# Patient Record
Sex: MALE | Race: WHITE | NOT HISPANIC OR LATINO | Employment: FULL TIME | URBAN - METROPOLITAN AREA
[De-identification: names, ages, dates, MRNs, and addresses within clinical notes are randomized per-mention and may not be internally consistent; named-entity substitution may affect disease eponyms.]

---

## 2023-03-07 PROBLEM — F41.1 ANXIETY STATE: Status: ACTIVE | Noted: 2019-01-01

## 2023-03-07 RX ORDER — VENLAFAXINE HYDROCHLORIDE 75 MG/1
1 CAPSULE, EXTENDED RELEASE ORAL EVERY MORNING
COMMUNITY
Start: 2022-11-07 | End: 2023-03-23 | Stop reason: SDUPTHER

## 2023-03-22 NOTE — PROGRESS NOTES
Subjective   Patient ID: Jimmy Jenkins is a 32 y.o. male who presents for Anxiety.    HPI   Patient presents with anxiety.  Condition well controlled.  Taking and tolerating Effexor XR 75mg qAM.  Reduced dose last visit and still felt like it was controlling anxiety well.  Coping with stress well.  Denies depressed mood. Denies panic attacks. Denies suicidal thoughts.  Much less stress with his current job. Working from home about half the time.   Watching diet closer and trying to lose wt.     Review of Systems    Objective   /80   Pulse 83   Temp 36.4 °C (97.6 °F)   Wt 92.1 kg (203 lb)   SpO2 98%     Physical Exam  HENT:      Head: Normocephalic.   Eyes:      General: No scleral icterus.  Cardiovascular:      Rate and Rhythm: Normal rate and regular rhythm.   Pulmonary:      Effort: Pulmonary effort is normal.      Breath sounds: Normal breath sounds.   Skin:     General: Skin is warm and dry.   Neurological:      Mental Status: He is alert.   Psychiatric:         Mood and Affect: Affect normal.       Assessment/Plan   Diagnoses and all orders for this visit:  GLORY (generalized anxiety disorder)  -     venlafaxine XR (Effexor-XR) 75 mg 24 hr capsule; Take 1 capsule (75 mg) by mouth once daily in the morning. (Replaces 150mg)       Counseled pt.    Discussed diet and exercise, and encouraged further weight loss.   Refilled Effexor XR 75mg  Follow up in 4 months, earlier if needed.

## 2023-03-23 ENCOUNTER — OFFICE VISIT (OUTPATIENT)
Dept: PRIMARY CARE | Facility: CLINIC | Age: 33
End: 2023-03-23
Payer: COMMERCIAL

## 2023-03-23 VITALS
SYSTOLIC BLOOD PRESSURE: 122 MMHG | TEMPERATURE: 97.6 F | OXYGEN SATURATION: 98 % | DIASTOLIC BLOOD PRESSURE: 80 MMHG | WEIGHT: 203 LBS | HEART RATE: 83 BPM

## 2023-03-23 DIAGNOSIS — F41.1 GAD (GENERALIZED ANXIETY DISORDER): Primary | ICD-10-CM

## 2023-03-23 PROCEDURE — 1036F TOBACCO NON-USER: CPT | Performed by: PHYSICIAN ASSISTANT

## 2023-03-23 PROCEDURE — 99213 OFFICE O/P EST LOW 20 MIN: CPT | Performed by: PHYSICIAN ASSISTANT

## 2023-03-23 RX ORDER — VENLAFAXINE HYDROCHLORIDE 75 MG/1
75 CAPSULE, EXTENDED RELEASE ORAL EVERY MORNING
Qty: 90 CAPSULE | Refills: 0 | Status: SHIPPED | OUTPATIENT
Start: 2023-03-23 | End: 2023-08-10 | Stop reason: DRUGHIGH

## 2023-03-23 ASSESSMENT — PATIENT HEALTH QUESTIONNAIRE - PHQ9
2. FEELING DOWN, DEPRESSED OR HOPELESS: NOT AT ALL
1. LITTLE INTEREST OR PLEASURE IN DOING THINGS: NOT AT ALL
SUM OF ALL RESPONSES TO PHQ9 QUESTIONS 1 AND 2: 0

## 2023-08-10 ENCOUNTER — OFFICE VISIT (OUTPATIENT)
Dept: PRIMARY CARE | Facility: CLINIC | Age: 33
End: 2023-08-10
Payer: COMMERCIAL

## 2023-08-10 VITALS
HEART RATE: 86 BPM | TEMPERATURE: 97.1 F | DIASTOLIC BLOOD PRESSURE: 74 MMHG | SYSTOLIC BLOOD PRESSURE: 122 MMHG | OXYGEN SATURATION: 98 %

## 2023-08-10 DIAGNOSIS — F41.1 GAD (GENERALIZED ANXIETY DISORDER): Primary | ICD-10-CM

## 2023-08-10 PROCEDURE — 99213 OFFICE O/P EST LOW 20 MIN: CPT | Performed by: PHYSICIAN ASSISTANT

## 2023-08-10 PROCEDURE — 1036F TOBACCO NON-USER: CPT | Performed by: PHYSICIAN ASSISTANT

## 2023-08-10 RX ORDER — VENLAFAXINE HYDROCHLORIDE 37.5 MG/1
37.5 CAPSULE, EXTENDED RELEASE ORAL DAILY
Qty: 30 CAPSULE | Refills: 1 | Status: SHIPPED | OUTPATIENT
Start: 2023-08-10 | End: 2023-11-07 | Stop reason: SDUPTHER

## 2023-08-10 RX ORDER — VENLAFAXINE HYDROCHLORIDE 75 MG/1
75 CAPSULE, EXTENDED RELEASE ORAL EVERY MORNING
Qty: 90 CAPSULE | Refills: 1 | Status: CANCELLED | OUTPATIENT
Start: 2023-08-10

## 2023-08-10 NOTE — PROGRESS NOTES
Subjective   Patient ID: Jimmy Jenkins is a 32 y.o. male who presents for No chief complaint on file..    HPI   Patient presents with anxiety.  Condition well controlled.  Taking and tolerating Effexor XR 75mg qAM.  Coping with stress well.  Denies depressed mood. Denies panic attacks. Denies suicidal thoughts.  Much less stress with his current job. Working from home about half the time. Interested in coming off the med.   Watching diet and staying.     Review of Systems    Objective   There were no vitals taken for this visit.    Physical Exam  HENT:      Head: Normocephalic.   Eyes:      General: No scleral icterus.  Cardiovascular:      Rate and Rhythm: Normal rate and regular rhythm.   Pulmonary:      Effort: Pulmonary effort is normal.      Breath sounds: Normal breath sounds.   Skin:     General: Skin is warm and dry.   Neurological:      Mental Status: He is alert.   Psychiatric:         Mood and Affect: Affect normal.       Assessment/Plan   Diagnoses and all orders for this visit:  GLORY (generalized anxiety disorder)       Counseled pt.    Discussed diet and exercise, and encouraged further weight loss.   Reduce to Venlafaxine XR 37.5mg for 1 month and then can try stopping the med.    Follow up if symptoms increase again or prn.

## 2023-11-07 ENCOUNTER — OFFICE VISIT (OUTPATIENT)
Dept: PRIMARY CARE | Facility: CLINIC | Age: 33
End: 2023-11-07
Payer: COMMERCIAL

## 2023-11-07 VITALS
TEMPERATURE: 97.5 F | SYSTOLIC BLOOD PRESSURE: 126 MMHG | DIASTOLIC BLOOD PRESSURE: 80 MMHG | HEART RATE: 79 BPM | WEIGHT: 197 LBS | OXYGEN SATURATION: 97 %

## 2023-11-07 DIAGNOSIS — F41.1 GAD (GENERALIZED ANXIETY DISORDER): Primary | ICD-10-CM

## 2023-11-07 PROCEDURE — 99213 OFFICE O/P EST LOW 20 MIN: CPT | Performed by: PHYSICIAN ASSISTANT

## 2023-11-07 PROCEDURE — 1036F TOBACCO NON-USER: CPT | Performed by: PHYSICIAN ASSISTANT

## 2023-11-07 RX ORDER — VENLAFAXINE HYDROCHLORIDE 75 MG/1
75 CAPSULE, EXTENDED RELEASE ORAL DAILY
Qty: 90 CAPSULE | Refills: 1 | Status: SHIPPED | OUTPATIENT
Start: 2023-11-07 | End: 2024-03-05 | Stop reason: SDUPTHER

## 2023-11-07 RX ORDER — VENLAFAXINE HYDROCHLORIDE 37.5 MG/1
37.5 CAPSULE, EXTENDED RELEASE ORAL DAILY
Qty: 30 CAPSULE | Refills: 0 | Status: SHIPPED | OUTPATIENT
Start: 2023-11-07 | End: 2024-02-05 | Stop reason: ALTCHOICE

## 2023-11-07 NOTE — PROGRESS NOTES
Subjective   Patient ID: Jimmy Jenkins is a 33 y.o. male who presents for Anxiety (Discuss Meds).    HPI   Patient presents with anxiety. Been off Venlafaxine the past 7-8 weeks.  Condition worsened off the med. More irritable. Mind racing. More stressed.   Denies depressed mood. Denies panic attacks. Denies suicidal thoughts.  Could even tell that symptoms were worsening when on the half dose (37.5mg) to wean off the med.  Would like to restart med. Thinks he will do fine on just the 75mg dose.   Review of Systems    Objective   /80   Pulse 79   Temp 36.4 °C (97.5 °F)   Wt 89.4 kg (197 lb)   SpO2 97%     Physical Exam  HENT:      Head: Normocephalic.   Eyes:      General: No scleral icterus.  Cardiovascular:      Rate and Rhythm: Normal rate and regular rhythm.   Pulmonary:      Effort: Pulmonary effort is normal.      Breath sounds: Normal breath sounds.   Skin:     General: Skin is warm and dry.   Neurological:      Mental Status: He is alert.   Psychiatric:         Mood and Affect: Affect normal.       Assessment/Plan   Diagnoses and all orders for this visit:  GLORY (generalized anxiety disorder)  -     venlafaxine XR (Effexor-XR) 37.5 mg 24 hr capsule; Take 1 capsule (37.5 mg) by mouth once daily.  -     venlafaxine XR (Effexor-XR) 75 mg 24 hr capsule; Take 1 capsule (75 mg) by mouth once daily. Take with food.       Counseled pt.   Restart Rx Venlafaxine XR 37.5 mg x 1 month and then increase to 75mg thereafter.   Follow up in 4 months for recheck anxiety, earlier if needed.

## 2024-03-05 ENCOUNTER — OFFICE VISIT (OUTPATIENT)
Dept: PRIMARY CARE | Facility: CLINIC | Age: 34
End: 2024-03-05
Payer: COMMERCIAL

## 2024-03-05 VITALS
DIASTOLIC BLOOD PRESSURE: 70 MMHG | OXYGEN SATURATION: 98 % | TEMPERATURE: 97.6 F | HEART RATE: 80 BPM | SYSTOLIC BLOOD PRESSURE: 114 MMHG | WEIGHT: 194 LBS

## 2024-03-05 DIAGNOSIS — F41.1 GAD (GENERALIZED ANXIETY DISORDER): Primary | ICD-10-CM

## 2024-03-05 PROCEDURE — 1036F TOBACCO NON-USER: CPT | Performed by: PHYSICIAN ASSISTANT

## 2024-03-05 PROCEDURE — 99213 OFFICE O/P EST LOW 20 MIN: CPT | Performed by: PHYSICIAN ASSISTANT

## 2024-03-05 RX ORDER — VENLAFAXINE HYDROCHLORIDE 37.5 MG/1
37.5 CAPSULE, EXTENDED RELEASE ORAL DAILY
Qty: 90 CAPSULE | Refills: 1 | Status: SHIPPED | OUTPATIENT
Start: 2024-03-05

## 2024-03-05 NOTE — PROGRESS NOTES
Subjective   Patient ID: Jimmy Jenkins is a 33 y.o. male who presents for Anxiety (Recheck ).    HPI   Patient presents with anxiety. Restarted Venlafaxine last visit. Taking 75mg every day and tolerating well. Less irritable. Less mind racing. Less stressed.  Denies depressed mood. Denies panic attacks. Denies suicidal thoughts.   Interested in reducing dose to 37.5mg since he felt fairly good on that dose.     Review of Systems    Objective   /70   Pulse 80   Temp 36.4 °C (97.6 °F)   Wt 88 kg (194 lb)   SpO2 98%     Physical Exam  HENT:      Head: Normocephalic.   Eyes:      General: No scleral icterus.  Cardiovascular:      Rate and Rhythm: Normal rate and regular rhythm.   Pulmonary:      Effort: Pulmonary effort is normal.      Breath sounds: Normal breath sounds.   Skin:     General: Skin is warm and dry.   Neurological:      Mental Status: He is alert.   Psychiatric:         Mood and Affect: Affect normal.       Assessment/Plan   Diagnoses and all orders for this visit:  GLORY (generalized anxiety disorder)  -     venlafaxine XR (Effexor-XR) 37.5 mg 24 hr capsule; Take 1 capsule (37.5 mg) by mouth once daily.       Counseled pt.   Switch back to Rx Venlafaxine XR 37.5mg.   Follow up in 4 months for recheck anxiety, earlier if needed.

## 2024-07-09 ENCOUNTER — APPOINTMENT (OUTPATIENT)
Dept: PRIMARY CARE | Facility: CLINIC | Age: 34
End: 2024-07-09
Payer: COMMERCIAL

## 2024-07-09 VITALS
HEART RATE: 75 BPM | DIASTOLIC BLOOD PRESSURE: 72 MMHG | TEMPERATURE: 97.8 F | OXYGEN SATURATION: 98 % | SYSTOLIC BLOOD PRESSURE: 114 MMHG | WEIGHT: 194 LBS

## 2024-07-09 DIAGNOSIS — F41.1 GAD (GENERALIZED ANXIETY DISORDER): Primary | ICD-10-CM

## 2024-07-09 PROCEDURE — 99213 OFFICE O/P EST LOW 20 MIN: CPT | Performed by: PHYSICIAN ASSISTANT

## 2024-07-09 PROCEDURE — 1036F TOBACCO NON-USER: CPT | Performed by: PHYSICIAN ASSISTANT

## 2024-07-09 RX ORDER — VENLAFAXINE HYDROCHLORIDE 75 MG/1
75 CAPSULE, EXTENDED RELEASE ORAL DAILY
Qty: 90 CAPSULE | Refills: 1 | Status: SHIPPED | OUTPATIENT
Start: 2024-07-09

## 2024-07-09 NOTE — PROGRESS NOTES
Subjective   Patient ID: Jimmy Jenkins is a 33 y.o. male who presents for Anxiety (Recheck ).    HPI   Patient presents with anxiety. Switched to Venlafaxine 37.5mg last visit (reduced from 75mg). Coping fair with stress he felt better on the 75 mg so would like to go back up.  Noticing a little bit more irritability and anxiousness on the lower dose.  Mood okay.  No panic attacks.  Tolerating medicine well.      Review of Systems    Objective   /72   Pulse 75   Temp 36.6 °C (97.8 °F)   Wt 88 kg (194 lb)   SpO2 98%     Physical Exam  HENT:      Head: Normocephalic.   Eyes:      General: No scleral icterus.  Cardiovascular:      Rate and Rhythm: Normal rate and regular rhythm.   Pulmonary:      Effort: Pulmonary effort is normal.      Breath sounds: Normal breath sounds.   Skin:     General: Skin is warm and dry.   Neurological:      Mental Status: He is alert.   Psychiatric:         Mood and Affect: Affect normal.       Assessment/Plan   Diagnoses and all orders for this visit:  GLORY (generalized anxiety disorder)  -     venlafaxine XR (Effexor-XR) 75 mg 24 hr capsule; Take 1 capsule (75 mg) by mouth once daily.     Counseled pt.   Switch back to Rx Venlafaxine XR 75mg every day.    Follow up in 4 months for recheck anxiety, earlier if needed.

## 2024-11-05 ENCOUNTER — APPOINTMENT (OUTPATIENT)
Dept: PRIMARY CARE | Facility: CLINIC | Age: 34
End: 2024-11-05
Payer: COMMERCIAL

## 2024-11-05 VITALS
OXYGEN SATURATION: 97 % | DIASTOLIC BLOOD PRESSURE: 68 MMHG | SYSTOLIC BLOOD PRESSURE: 116 MMHG | TEMPERATURE: 97.7 F | HEART RATE: 83 BPM | WEIGHT: 200 LBS

## 2024-11-05 DIAGNOSIS — Z00.00 ROUTINE GENERAL MEDICAL EXAMINATION AT A HEALTH CARE FACILITY: ICD-10-CM

## 2024-11-05 DIAGNOSIS — F41.1 GAD (GENERALIZED ANXIETY DISORDER): Primary | ICD-10-CM

## 2024-11-05 PROCEDURE — 1036F TOBACCO NON-USER: CPT | Performed by: PHYSICIAN ASSISTANT

## 2024-11-05 PROCEDURE — 99213 OFFICE O/P EST LOW 20 MIN: CPT | Performed by: PHYSICIAN ASSISTANT

## 2024-11-05 RX ORDER — VENLAFAXINE HYDROCHLORIDE 75 MG/1
75 CAPSULE, EXTENDED RELEASE ORAL DAILY
Qty: 90 CAPSULE | Refills: 1 | Status: SHIPPED | OUTPATIENT
Start: 2024-11-05

## 2024-11-05 ASSESSMENT — PATIENT HEALTH QUESTIONNAIRE - PHQ9: 1. LITTLE INTEREST OR PLEASURE IN DOING THINGS: NOT AT ALL

## 2024-11-05 NOTE — PROGRESS NOTES
"Subjective   Patient ID: Jimmy Jenkins is a 34 y.o. male who presents for Anxiety.    HPI   Patient presents with anxiety. Switched to Venlafaxine 75mg last visit. Controlling symptoms well. No significant mind racing.   Mood okay.  No panic attacks.  Tolerating medicine well.        No results found for: \"LDLCALC\"  LDL (mg/dL)   Date Value   02/04/2019 71     Triglycerides (mg/dL)   Date Value   02/04/2019 63     HDL (mg/dL)   Date Value   02/04/2019 54.1         Review of Systems    Objective   /68   Pulse 83   Temp 36.5 °C (97.7 °F)   Wt 90.7 kg (200 lb)   SpO2 97%     Physical Exam  HENT:      Head: Normocephalic.   Eyes:      General: No scleral icterus.  Cardiovascular:      Rate and Rhythm: Normal rate and regular rhythm.   Pulmonary:      Effort: Pulmonary effort is normal.      Breath sounds: Normal breath sounds.   Skin:     General: Skin is warm and dry.   Neurological:      Mental Status: He is alert.   Psychiatric:         Mood and Affect: Affect normal.       Assessment/Plan   Diagnoses and all orders for this visit:  GLORY (generalized anxiety disorder)  -     venlafaxine XR (Effexor-XR) 75 mg 24 hr capsule; Take 1 capsule (75 mg) by mouth once daily.  Routine general medical examination at a health care facility  -     Basic Metabolic Panel; Future  -     Lipid Panel; Future  -     Hepatitis C Antibody; Future  -     HIV 1/2 Antigen/Antibody Screen with Reflex to Confirmation; Future       Counseled pt.   Refilled Venlafaxine XR 75mg.    Follow up in 4 months for recheck anxiety, with fasting labs the week before.   "

## 2025-03-06 ENCOUNTER — APPOINTMENT (OUTPATIENT)
Dept: PRIMARY CARE | Facility: CLINIC | Age: 35
End: 2025-03-06
Payer: COMMERCIAL

## 2025-03-06 VITALS
WEIGHT: 198 LBS | HEART RATE: 79 BPM | OXYGEN SATURATION: 98 % | SYSTOLIC BLOOD PRESSURE: 116 MMHG | DIASTOLIC BLOOD PRESSURE: 78 MMHG | TEMPERATURE: 97.4 F

## 2025-03-06 DIAGNOSIS — F41.1 GAD (GENERALIZED ANXIETY DISORDER): Primary | ICD-10-CM

## 2025-03-06 PROCEDURE — 99213 OFFICE O/P EST LOW 20 MIN: CPT | Performed by: PHYSICIAN ASSISTANT

## 2025-03-06 PROCEDURE — 1036F TOBACCO NON-USER: CPT | Performed by: PHYSICIAN ASSISTANT

## 2025-03-06 RX ORDER — VENLAFAXINE HYDROCHLORIDE 75 MG/1
75 CAPSULE, EXTENDED RELEASE ORAL DAILY
Qty: 90 CAPSULE | Refills: 1 | Status: SHIPPED | OUTPATIENT
Start: 2025-03-06

## 2025-03-06 ASSESSMENT — PATIENT HEALTH QUESTIONNAIRE - PHQ9
SUM OF ALL RESPONSES TO PHQ9 QUESTIONS 1 AND 2: 0
1. LITTLE INTEREST OR PLEASURE IN DOING THINGS: NOT AT ALL
2. FEELING DOWN, DEPRESSED OR HOPELESS: NOT AT ALL

## 2025-03-06 NOTE — PROGRESS NOTES
"Subjective   Patient ID: Jimmy Jenkins is a 34 y.o. male who presents for Anxiety.    HPI   Patient presents with anxiety. Taking Venlafaxine 75mg every day and tolerating med well. . Controlling symptoms well. No significant mind racing.   Mood okay.  No panic attacks.  Sleeps ok.     Didn't get labs done as ordered for this visit.   No results found for: \"LDLCALC\"  LDL (mg/dL)   Date Value   02/04/2019 71     Triglycerides (mg/dL)   Date Value   02/04/2019 63     HDL (mg/dL)   Date Value   02/04/2019 54.1           Review of Systems    Objective   /78   Pulse 79   Temp 36.3 °C (97.4 °F)   Wt 89.8 kg (198 lb)   SpO2 98%     Physical Exam  HENT:      Head: Normocephalic.   Eyes:      General: No scleral icterus.  Cardiovascular:      Rate and Rhythm: Normal rate and regular rhythm.   Pulmonary:      Effort: Pulmonary effort is normal.      Breath sounds: Normal breath sounds.   Skin:     General: Skin is warm and dry.   Neurological:      Mental Status: He is alert.   Psychiatric:         Mood and Affect: Affect normal.       Assessment/Plan   Diagnoses and all orders for this visit:  GLORY (generalized anxiety disorder)  -     venlafaxine XR (Effexor-XR) 75 mg 24 hr capsule; Take 1 capsule (75 mg) by mouth once daily.       Counseled pt.   Refilled Venlafaxine XR 75mg.    Discussed diet and exercise and encouraged wt loss.   Follow up in 4 months for recheck anxiety, with fasting labs the week before.   "

## 2025-04-14 DIAGNOSIS — F41.1 GAD (GENERALIZED ANXIETY DISORDER): ICD-10-CM

## 2025-04-21 RX ORDER — VENLAFAXINE HYDROCHLORIDE 75 MG/1
CAPSULE, EXTENDED RELEASE ORAL
Qty: 90 CAPSULE | Refills: 1 | OUTPATIENT
Start: 2025-04-21

## 2025-07-08 ENCOUNTER — APPOINTMENT (OUTPATIENT)
Dept: PRIMARY CARE | Facility: CLINIC | Age: 35
End: 2025-07-08
Payer: COMMERCIAL

## 2025-07-08 VITALS
TEMPERATURE: 97 F | WEIGHT: 194 LBS | SYSTOLIC BLOOD PRESSURE: 124 MMHG | OXYGEN SATURATION: 98 % | HEART RATE: 82 BPM | DIASTOLIC BLOOD PRESSURE: 86 MMHG

## 2025-07-08 DIAGNOSIS — Z00.00 ROUTINE GENERAL MEDICAL EXAMINATION AT A HEALTH CARE FACILITY: ICD-10-CM

## 2025-07-08 DIAGNOSIS — F41.1 GAD (GENERALIZED ANXIETY DISORDER): Primary | ICD-10-CM

## 2025-07-08 PROCEDURE — 99213 OFFICE O/P EST LOW 20 MIN: CPT | Performed by: PHYSICIAN ASSISTANT

## 2025-07-08 PROCEDURE — 1036F TOBACCO NON-USER: CPT | Performed by: PHYSICIAN ASSISTANT

## 2025-07-08 RX ORDER — VENLAFAXINE HYDROCHLORIDE 75 MG/1
75 CAPSULE, EXTENDED RELEASE ORAL DAILY
Qty: 90 CAPSULE | Refills: 1 | Status: SHIPPED | OUTPATIENT
Start: 2025-07-08

## 2025-07-08 NOTE — PROGRESS NOTES
"Subjective   Patient ID: Jimmy Jenkins is a 34 y.o. male who presents for Anxiety.    HPI   Patient presents with anxiety. Taking Venlafaxine 75mg every day and tolerating med well.  Controlling symptoms well. No significant mind racing.   Mood ok.  No panic attacks.  Sleeps ok.  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 Hoping this will be less stressful. Hoping next year after getting settled in with the new job position that he may be able to try to wean off the med.     Didn't get labs done as ordered for this visit.    No results found for: \"LDLCALC\"  LDL (mg/dL)   Date Value   02/04/2019 71     Triglycerides (mg/dL)   Date Value   02/04/2019 63     HDL (mg/dL)   Date Value   02/04/2019 54.1           Review of Systems    Objective   /86   Pulse 82   Temp 36.1 °C (97 °F)   Wt 88 kg (194 lb)   SpO2 98%     Physical Exam  HENT:      Head: Normocephalic.   Eyes:      General: No scleral icterus.  Cardiovascular:      Rate and Rhythm: Normal rate and regular rhythm.   Pulmonary:      Effort: Pulmonary effort is normal.      Breath sounds: Normal breath sounds.   Skin:     General: Skin is warm and dry.   Neurological:      Mental Status: He is alert.   Psychiatric:         Mood and Affect: Affect normal.       Assessment/Plan   Diagnoses and all orders for this visit:  GLORY (generalized anxiety disorder)  -     venlafaxine XR (Effexor-XR) 75 mg 24 hr capsule; Take 1 capsule (75 mg) by mouth once daily.  Routine general medical examination at a health care facility  -     HIV 1/2 Antigen/Antibody Screen with Reflex to Confirmation; Future  -     Hepatitis C Antibody; Future  -     Lipid Panel; Future  -     Basic Metabolic Panel; Future  Other orders  -     Follow Up In Primary Care; Future       Counseled pt.   Refilled Venlafaxine XR 75mg.    Discussed diet and exercise and encouraged wt loss.   Follow up in 4 months for recheck anxiety, with " fasting labs the week before.

## 2025-07-17 LAB
ANION GAP SERPL CALCULATED.4IONS-SCNC: 9 MMOL/L (CALC) (ref 7–17)
BUN SERPL-MCNC: 16 MG/DL (ref 7–25)
BUN/CREAT SERPL: NORMAL (CALC) (ref 6–22)
CALCIUM SERPL-MCNC: 9.1 MG/DL (ref 8.6–10.3)
CHLORIDE SERPL-SCNC: 105 MMOL/L (ref 98–110)
CHOLEST SERPL-MCNC: 161 MG/DL
CHOLEST/HDLC SERPL: 3 (CALC)
CO2 SERPL-SCNC: 26 MMOL/L (ref 20–32)
CREAT SERPL-MCNC: 0.86 MG/DL (ref 0.6–1.26)
EGFRCR SERPLBLD CKD-EPI 2021: 115 ML/MIN/1.73M2
GLUCOSE SERPL-MCNC: 93 MG/DL (ref 65–99)
HCV AB SERPL QL IA: NORMAL
HDLC SERPL-MCNC: 53 MG/DL
HIV 1+2 AB+HIV1 P24 AG SERPL QL IA: NORMAL
HIV 1+2 AB+HIV1 P24 AG SERPL QL IA: NORMAL
LDLC SERPL CALC-MCNC: 92 MG/DL (CALC)
NONHDLC SERPL-MCNC: 108 MG/DL (CALC)
POTASSIUM SERPL-SCNC: 4.2 MMOL/L (ref 3.5–5.3)
SODIUM SERPL-SCNC: 140 MMOL/L (ref 135–146)
TRIGL SERPL-MCNC: 73 MG/DL

## 2025-07-25 ENCOUNTER — RESULTS FOLLOW-UP (OUTPATIENT)
Dept: PRIMARY CARE | Facility: CLINIC | Age: 35
End: 2025-07-25
Payer: COMMERCIAL

## 2025-07-28 NOTE — TELEPHONE ENCOUNTER
----- Message from Nikko Roper sent at 7/25/2025  6:36 PM EDT -----  Inform patient that his labs were all normal.  His cholesterol levels were excellent.  Sugar level normal.  Kidney tests and electrolytes were normal.  His hepatitis C and HIV screening test were   both negative.  ----- Message -----  From: [facility] Results In  Sent: 7/17/2025   7:28 AM EDT  To: Nikko Roper PA-C

## 2025-11-07 ENCOUNTER — APPOINTMENT (OUTPATIENT)
Dept: PRIMARY CARE | Facility: CLINIC | Age: 35
End: 2025-11-07
Payer: COMMERCIAL